# Patient Record
Sex: MALE | Race: WHITE | NOT HISPANIC OR LATINO | URBAN - METROPOLITAN AREA
[De-identification: names, ages, dates, MRNs, and addresses within clinical notes are randomized per-mention and may not be internally consistent; named-entity substitution may affect disease eponyms.]

---

## 2021-12-22 ENCOUNTER — OFFICE VISIT (OUTPATIENT)
Dept: URBAN - METROPOLITAN AREA CLINIC 71 | Facility: CLINIC | Age: 86
End: 2021-12-22
Payer: COMMERCIAL

## 2021-12-22 DIAGNOSIS — E11.9 TYPE 2 DIABETES MELLITUS WITHOUT COMPLICATIONS: Primary | ICD-10-CM

## 2021-12-22 DIAGNOSIS — Z96.1 PRESENCE OF INTRAOCULAR LENS: ICD-10-CM

## 2021-12-22 DIAGNOSIS — H43.813 VITREOUS DEGENERATION, BILATERAL: ICD-10-CM

## 2021-12-22 PROCEDURE — 92004 COMPRE OPH EXAM NEW PT 1/>: CPT | Performed by: OPHTHALMOLOGY

## 2021-12-22 ASSESSMENT — INTRAOCULAR PRESSURE
OS: 10
OD: 12

## 2021-12-22 NOTE — IMPRESSION/PLAN
Impression: Type 2 diabetes mellitus without complications: R61.9. No retinopathy Plan: Discussed. Continue to keep blood sugars under good control and manage diabetes with PCP. Contact office with any issues or changes in vision.  Will continue to monitor annually

## 2021-12-22 NOTE — IMPRESSION/PLAN
Impression: Type 2 diabetes mellitus without complications: U30.1. No holes, tears or detachments on physical exam or [employer] Plan: Contact office with any new or worsening symptoms.  Continue to monitor